# Patient Record
Sex: FEMALE | URBAN - METROPOLITAN AREA
[De-identification: names, ages, dates, MRNs, and addresses within clinical notes are randomized per-mention and may not be internally consistent; named-entity substitution may affect disease eponyms.]

---

## 2021-01-27 ENCOUNTER — IMPORTED ENCOUNTER (OUTPATIENT)
Dept: URBAN - METROPOLITAN AREA CLINIC 50 | Facility: CLINIC | Age: 61
End: 2021-01-27

## 2021-01-27 NOTE — PATIENT DISCUSSION
"""Discussed importance of artificial tear usage OU"" ""Increase Artificial tears both eyes two - four times a day . """

## 2021-02-08 ENCOUNTER — IMPORTED ENCOUNTER (OUTPATIENT)
Dept: URBAN - METROPOLITAN AREA CLINIC 50 | Facility: CLINIC | Age: 61
End: 2021-02-08

## 2021-02-24 ENCOUNTER — IMPORTED ENCOUNTER (OUTPATIENT)
Dept: URBAN - METROPOLITAN AREA CLINIC 50 | Facility: CLINIC | Age: 61
End: 2021-02-24

## 2021-03-02 ENCOUNTER — IMPORTED ENCOUNTER (OUTPATIENT)
Dept: URBAN - METROPOLITAN AREA CLINIC 50 | Facility: CLINIC | Age: 61
End: 2021-03-02

## 2021-03-02 NOTE — PATIENT DISCUSSION
"""S/P IOL OS: Tecnis MF [id number] +19.0 (Target: [place]) +Femto/Arcs +Omidria.  Continue post operative instructions and drops per schedule. "" ""Start Pred-Moxi-Nepaf left eye three times a day

## 2021-03-10 ENCOUNTER — IMPORTED ENCOUNTER (OUTPATIENT)
Dept: URBAN - METROPOLITAN AREA CLINIC 50 | Facility: CLINIC | Age: 61
End: 2021-03-10

## 2021-03-18 ENCOUNTER — IMPORTED ENCOUNTER (OUTPATIENT)
Dept: URBAN - METROPOLITAN AREA CLINIC 50 | Facility: CLINIC | Age: 61
End: 2021-03-18

## 2021-03-18 NOTE — PATIENT DISCUSSION
"""S/P IOL OD: [employer] ZXR00 +19.50 +Femto/Arcs +Omidria.  Continue post operative instructions and drops per schedule. "" ""Increase Pred-Moxi-Nepaf right eye three times a day

## 2021-03-24 ENCOUNTER — IMPORTED ENCOUNTER (OUTPATIENT)
Dept: URBAN - METROPOLITAN AREA CLINIC 50 | Facility: CLINIC | Age: 61
End: 2021-03-24

## 2021-03-24 NOTE — PATIENT DISCUSSION
"""S/P IOL OD: [employer] ZXR00 +19.50 +Femto/Arcs +Omidria.  Continue post operative instructions and drops per schedule. "" ""Continue Pred-Moxi-Nepaf right eye three times a day

## 2021-04-17 ASSESSMENT — VISUAL ACUITY
OD_SC: 20/20
OS_CC: J1+@ 14 IN
OS_BAT: 20/25
OS_OTHER: 20/25. 20/70.
OS_OTHER: 20/25. 20/25.
OD_CC: J1+@ 14 IN
OS_SC: 20/20-1
OD_SC: 20/25
OS_BAT: 20/25
OD_SC: 20/30
OD_OTHER: 20/30-. 20/70.
OD_BAT: 20/30-
OS_SC: 20/25-1
OS_CC: J1+@ 14 IN
OD_SC: 20/30-1
OD_BAT: 20/30-
OD_OTHER: 20/30-. 20/70.
OD_BAT: 20/30-
OS_SC: 20/25
OS_PH: @ 14 IN
OD_CC: J1+@ 14 IN
OD_OTHER: 20/30-. 20/70.
OD_SC: 20/25
OD_PH: @ 14 IN
OS_CC: 20/40-2
OD_PH: @ 16 IN
OS_SC: 20/30
OD_PH: 20/25-1

## 2021-04-17 ASSESSMENT — TONOMETRY
OS_IOP_MMHG: 13
OD_IOP_MMHG: 13
OS_IOP_MMHG: 13
OS_IOP_MMHG: 14
OS_IOP_MMHG: 13
OS_IOP_MMHG: 13
OD_IOP_MMHG: 21
OD_IOP_MMHG: 13